# Patient Record
Sex: MALE | Race: WHITE
[De-identification: names, ages, dates, MRNs, and addresses within clinical notes are randomized per-mention and may not be internally consistent; named-entity substitution may affect disease eponyms.]

---

## 2020-04-09 NOTE — NUR
PATIENT BROUGHT TO ER FOR BLOOD ALCOHOL DRAW TO BE PLACED INTO EVIDENCE; 
OFFICER PACO (#[id number]) MADE REQUEST AND PATIENT OFFERS HIS VERBAL CONSENT FOR 
PROCEDURE



EQUIPMENT FOR PROCEDURE WAS GATHERED AND OFFICER PACO PROVIDED TWO VIALS



AT 1819, PATIENT WAS PLACED SUPINE AND LEFT ARM EXTENDED; NURSE PUT ON SIZE 
LARGE NITRILE GLOVES AND MID FOREARM WAS PREPPED WITH POVIDONE-IODINE 10% 
SOLUTION ON 4X4 GAUZE; TOURNIQUET WAS APPLIED



12 CC SYRINGE WITH 22 GAUGE NEEDLE WAS PREPARED AND USED TO PUNCTURE LARGE VEIN 
ON THE MID ANTERIOR ASPECT OF THE LEFT FOREARM; (BEVEL UP)



11 CC'S OF VENOUS BLOOD WAS GENTLY ASPIRATED; NEEDLE REMOVED AND 4X4 GAUZE 
DRESSING APPLIED WITH PLASTIC TAPE; TOURNIQUET WAS REMOVED



10CC OF THE BLOOD SAMPLE WAS GENTLY INJECTED INTO FIRST VIAL AND GIVEN TO 
OFFICER PACO



A SECOND VIAL WAS PRESENTED AND 1 CC OF THE BLOOD SAMPLE WAS GENTLY INJECTED 
INTO THE VIAL (STATED WOULD BE THE PATIENT'S VIAL IF HE WISHED TO KEEP IT); 
ALSO GIVEN TO OFFICER PACO



PATIENT LEFT WITH OFFICER ANTONIO IN CUSTODY

## 2023-02-04 ENCOUNTER — HOSPITAL ENCOUNTER (INPATIENT)
Dept: HOSPITAL 4 - SED | Age: 38
LOS: 4 days | Discharge: HOME HEALTH | DRG: 516 | End: 2023-02-08
Attending: FAMILY MEDICINE | Admitting: FAMILY MEDICINE
Payer: COMMERCIAL

## 2023-02-04 VITALS — SYSTOLIC BLOOD PRESSURE: 119 MMHG

## 2023-02-04 VITALS — HEIGHT: 63 IN | BODY MASS INDEX: 27.46 KG/M2 | WEIGHT: 155 LBS

## 2023-02-04 VITALS — SYSTOLIC BLOOD PRESSURE: 140 MMHG

## 2023-02-04 VITALS — SYSTOLIC BLOOD PRESSURE: 126 MMHG

## 2023-02-04 DIAGNOSIS — Z79.899: ICD-10-CM
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
## 2023-02-04 DIAGNOSIS — S82.032A: Primary | ICD-10-CM
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
## 2023-02-04 DIAGNOSIS — J98.11: ICD-10-CM

## 2023-02-04 DIAGNOSIS — Z20.822: ICD-10-CM

## 2023-02-04 LAB
ANION GAP SERPL CALCULATED.3IONS-SCNC: 13 MMOL/L (ref 5–15)
BASOPHILS # BLD AUTO: 0.1 K/UL (ref 0–0.2)
BASOPHILS NFR BLD AUTO: 0.6 % (ref 0–2)
BUN SERPL-MCNC: 19 MG/DL (ref 8–21)
CALCIUM SERPL-MCNC: 8.7 MG/DL (ref 8.4–11)
CHLORIDE SERPL-SCNC: 103 MMOL/L (ref 98–107)
CREAT SERPL-MCNC: 0.99 MG/DL (ref 0.55–1.3)
EOSINOPHIL # BLD AUTO: 0 K/UL (ref 0–0.4)
EOSINOPHIL NFR BLD AUTO: 0.2 % (ref 0–4)
ERYTHROCYTE [DISTWIDTH] IN BLOOD BY AUTOMATED COUNT: 13.5 % (ref 9–15)
GFR SERPL CREATININE-BSD FRML MDRD: 108 ML/MIN (ref 90–?)
GLUCOSE SERPL-MCNC: 131 MG/DL (ref 70–99)
HCT VFR BLD AUTO: 45.8 % (ref 36–54)
HGB BLD-MCNC: 15.8 G/DL (ref 14–18)
INR PPP: 1 (ref 0.8–1.2)
LYMPHOCYTES # BLD AUTO: 1.1 K/UL (ref 1–5.5)
LYMPHOCYTES NFR BLD AUTO: 10.5 % (ref 20.5–51.5)
MCH RBC QN AUTO: 31 PG (ref 27–31)
MCHC RBC AUTO-ENTMCNC: 35 % (ref 32–36)
MCV RBC AUTO: 90 FL (ref 79–98)
MONOCYTES # BLD MANUAL: 0.3 K/UL (ref 0–1)
MONOCYTES # BLD MANUAL: 3.2 % (ref 1.7–9.3)
NEUTROPHILS # BLD AUTO: 8.7 K/UL (ref 1.8–7.7)
NEUTROPHILS NFR BLD AUTO: 85.5 % (ref 40–70)
PLATELET # BLD AUTO: 237 K/UL (ref 130–430)
PROTHROMBIN TIME: 10.6 SECS (ref 9.5–12.5)
RBC # BLD AUTO: 5.08 MIL/UL (ref 4.2–6.2)
WBC # BLD AUTO: 10.2 K/UL (ref 4.8–10.8)

## 2023-02-04 PROCEDURE — 0QSF04Z REPOSITION LEFT PATELLA WITH INTERNAL FIXATION DEVICE, OPEN APPROACH: ICD-10-PCS | Performed by: STUDENT IN AN ORGANIZED HEALTH CARE EDUCATION/TRAINING PROGRAM

## 2023-02-04 PROCEDURE — C1713 ANCHOR/SCREW BN/BN,TIS/BN: HCPCS

## 2023-02-04 RX ADMIN — HYDROMORPHONE HYDROCHLORIDE PRN MG: 1 INJECTION, SOLUTION INTRAMUSCULAR; INTRAVENOUS; SUBCUTANEOUS at 13:17

## 2023-02-04 RX ADMIN — HYDROMORPHONE HYDROCHLORIDE PRN MG: 1 INJECTION, SOLUTION INTRAMUSCULAR; INTRAVENOUS; SUBCUTANEOUS at 21:10

## 2023-02-04 RX ADMIN — ONDANSETRON PRN MG: 2 INJECTION INTRAMUSCULAR; INTRAVENOUS at 08:20

## 2023-02-04 RX ADMIN — DEXTROSE SCH MLS/HR: 50 INJECTION, SOLUTION INTRAVENOUS at 21:15

## 2023-02-04 RX ADMIN — ONDANSETRON PRN MG: 2 INJECTION INTRAMUSCULAR; INTRAVENOUS at 21:10

## 2023-02-04 NOTE — NUR
Report received from day shift RN for continuity of care. Patient stable in bed. Polar pump 
noted on left leg with ace wrap around. VSS

## 2023-02-04 NOTE — NUR
Patient will be admitted to care of [facility].  Admitted to Med Surg unit.  Will go 
to operating room with OR GARTH Lynne.  Belongings list completed.  Complete and 
up to date summary report printed. SBAR report to be given at bedside with 
opportunity for questions.

## 2023-02-04 NOTE — NUR
General Surgery Week 3 Survey    Flowsheet Row Responses   [facility] facility patient discharged from? [place]   Does the patient have one of the following disease processes/diagnoses(primary or secondary)? General Surgery   Week 3 attempt successful? No   Unsuccessful attempts Attempt 1          TAM FLORES - Registered Nurse   pt is here left knee pain about 10/10 the is swelling but no redness noted, he 
slipped and he thinks that he disclocated his knee. He has been drinking since 
it was his birthday. room air

## 2023-02-04 NOTE — NUR
Admit bed requested 



Patient will be admitted to care of 

Admitted to MS unit.

Diagnosis  Left Knee Fracture

Inpatient (Yes or No)  yes

Observation (Yes or No) no

Orientation concerns or request close to nursing station  (Yes or No) no

Covid Status pending

On vent or bipap no

Isolation requirements no

Needs a sitter no

From Home (Yes or if No enter name of facility) yes

Requires Dialysis (Yes or No) no 

Med Rec Completed (Yes of No) no home meds

## 2023-02-04 NOTE — NUR
Recieved report from GARTH Suggs; Pt is aaox4, complains of left knee pain from 
mechanical fall post alcohol birthday celebration. Pt is NPO per MD Rose. 
Orders of consult to be contacted MD Estevez Orthopedist; notified Unit 
secretartbraeden Painter.

## 2023-02-04 NOTE — NUR
Pt vomiting into emesis basin; pt blood pressure stable. Pt complains of 
nausea. Contacted MD Rose and left voice mail for return.

## 2023-02-05 VITALS — SYSTOLIC BLOOD PRESSURE: 117 MMHG

## 2023-02-05 VITALS — SYSTOLIC BLOOD PRESSURE: 134 MMHG

## 2023-02-05 VITALS — SYSTOLIC BLOOD PRESSURE: 139 MMHG

## 2023-02-05 VITALS — SYSTOLIC BLOOD PRESSURE: 148 MMHG

## 2023-02-05 RX ADMIN — HYDROCODONE BITARTRATE AND ACETAMINOPHEN PRN TAB: 5; 325 TABLET ORAL at 18:36

## 2023-02-05 RX ADMIN — KETOROLAC TROMETHAMINE PRN MG: 15 INJECTION, SOLUTION INTRAMUSCULAR; INTRAVENOUS at 16:45

## 2023-02-05 RX ADMIN — KETOROLAC TROMETHAMINE PRN MG: 15 INJECTION, SOLUTION INTRAMUSCULAR; INTRAVENOUS at 04:08

## 2023-02-05 RX ADMIN — DEXTROSE SCH MLS/HR: 50 INJECTION, SOLUTION INTRAVENOUS at 04:08

## 2023-02-05 RX ADMIN — HYDROCODONE BITARTRATE AND ACETAMINOPHEN PRN TAB: 5; 325 TABLET ORAL at 14:06

## 2023-02-05 RX ADMIN — KETOROLAC TROMETHAMINE PRN MG: 15 INJECTION, SOLUTION INTRAMUSCULAR; INTRAVENOUS at 11:00

## 2023-02-05 RX ADMIN — DEXTROSE SCH MLS/HR: 50 INJECTION, SOLUTION INTRAVENOUS at 20:47

## 2023-02-05 RX ADMIN — DEXTROSE SCH MLS/HR: 50 INJECTION, SOLUTION INTRAVENOUS at 14:06

## 2023-02-05 RX ADMIN — HYDROCODONE BITARTRATE AND ACETAMINOPHEN PRN TAB: 5; 325 TABLET ORAL at 09:52

## 2023-02-05 NOTE — NUR
Patient c/o knee pain again. Patient pain meds not due. Dr. Rose notified. New orders noted 
and carried out.

## 2023-02-05 NOTE — NUR
PATIENT C/O 6/10 KNEE PAIN, TOO SOON FOR IV TORADOL AND PATIENT DECLINES IV DILAUDID, ORDER 
FOR PO NORCO OBTAINED FROM NP

## 2023-02-05 NOTE — NUR
Patient resting and changed as appropriate. Vital signs stable. Patient IV started on right 
AC 22G and is C/D/I with good blood return.

## 2023-02-05 NOTE — NUR
RECEIVED PATIENT FROM PM NURSE, ALERT AND ORIENTED, ABLE TO VERBALIZE NEEDS, NO C/O PAIN AT 
THIS TIME, POLAR CARE WITH IMMOBILIZER IN PLACE, CMS CHECK DONE, WILL ASSUME ALL CARE OF 
PATIENT

## 2023-02-06 VITALS — SYSTOLIC BLOOD PRESSURE: 132 MMHG

## 2023-02-06 VITALS — SYSTOLIC BLOOD PRESSURE: 112 MMHG

## 2023-02-06 VITALS — SYSTOLIC BLOOD PRESSURE: 119 MMHG

## 2023-02-06 VITALS — SYSTOLIC BLOOD PRESSURE: 114 MMHG

## 2023-02-06 VITALS — SYSTOLIC BLOOD PRESSURE: 108 MMHG

## 2023-02-06 VITALS — SYSTOLIC BLOOD PRESSURE: 146 MMHG

## 2023-02-06 LAB
ANION GAP SERPL CALCULATED.3IONS-SCNC: 8 MMOL/L (ref 5–15)
BASOPHILS # BLD AUTO: 0 K/UL (ref 0–0.2)
BASOPHILS NFR BLD AUTO: 0.2 % (ref 0–2)
BUN SERPL-MCNC: 14 MG/DL (ref 8–21)
CALCIUM SERPL-MCNC: 8.5 MG/DL (ref 8.4–11)
CHLORIDE SERPL-SCNC: 103 MMOL/L (ref 98–107)
CREAT SERPL-MCNC: 1.19 MG/DL (ref 0.55–1.3)
EOSINOPHIL # BLD AUTO: 0 K/UL (ref 0–0.4)
EOSINOPHIL NFR BLD AUTO: 0.5 % (ref 0–4)
ERYTHROCYTE [DISTWIDTH] IN BLOOD BY AUTOMATED COUNT: 13.4 % (ref 9–15)
GFR SERPL CREATININE-BSD FRML MDRD: 88 ML/MIN (ref 90–?)
GLUCOSE SERPL-MCNC: 141 MG/DL (ref 70–99)
HCT VFR BLD AUTO: 39 % (ref 36–54)
HGB BLD-MCNC: 13.7 G/DL (ref 14–18)
LYMPHOCYTES # BLD AUTO: 1.8 K/UL (ref 1–5.5)
LYMPHOCYTES NFR BLD AUTO: 22.1 % (ref 20.5–51.5)
MCH RBC QN AUTO: 32 PG (ref 27–31)
MCHC RBC AUTO-ENTMCNC: 35 % (ref 32–36)
MCV RBC AUTO: 90 FL (ref 79–98)
MONOCYTES # BLD MANUAL: 0.5 K/UL (ref 0–1)
MONOCYTES # BLD MANUAL: 6 % (ref 1.7–9.3)
NEUTROPHILS # BLD AUTO: 5.7 K/UL (ref 1.8–7.7)
NEUTROPHILS NFR BLD AUTO: 71.2 % (ref 40–70)
PLATELET # BLD AUTO: 200 K/UL (ref 130–430)
RBC # BLD AUTO: 4.34 MIL/UL (ref 4.2–6.2)
WBC # BLD AUTO: 8 K/UL (ref 4.8–10.8)

## 2023-02-06 RX ADMIN — DEXTROSE SCH MLS/HR: 50 INJECTION, SOLUTION INTRAVENOUS at 04:22

## 2023-02-06 RX ADMIN — HYDROCODONE BITARTRATE AND ACETAMINOPHEN PRN TAB: 5; 325 TABLET ORAL at 15:50

## 2023-02-06 RX ADMIN — ENOXAPARIN SODIUM SCH MG: 40 INJECTION SUBCUTANEOUS at 17:15

## 2023-02-06 RX ADMIN — KETOROLAC TROMETHAMINE PRN MG: 15 INJECTION, SOLUTION INTRAMUSCULAR; INTRAVENOUS at 17:15

## 2023-02-06 RX ADMIN — DEXTROSE SCH MLS/HR: 50 INJECTION, SOLUTION INTRAVENOUS at 21:15

## 2023-02-06 RX ADMIN — HYDROMORPHONE HYDROCHLORIDE PRN MG: 1 INJECTION, SOLUTION INTRAMUSCULAR; INTRAVENOUS; SUBCUTANEOUS at 18:30

## 2023-02-06 RX ADMIN — HYDROMORPHONE HYDROCHLORIDE PRN MG: 1 INJECTION, SOLUTION INTRAMUSCULAR; INTRAVENOUS; SUBCUTANEOUS at 12:53

## 2023-02-06 RX ADMIN — DEXTROSE SCH MLS/HR: 50 INJECTION, SOLUTION INTRAVENOUS at 15:08

## 2023-02-06 RX ADMIN — HYDROCODONE BITARTRATE AND ACETAMINOPHEN PRN TAB: 5; 325 TABLET ORAL at 21:14

## 2023-02-06 RX ADMIN — HYDROCODONE BITARTRATE AND ACETAMINOPHEN PRN TAB: 5; 325 TABLET ORAL at 04:22

## 2023-02-06 RX ADMIN — HYDROMORPHONE HYDROCHLORIDE PRN MG: 1 INJECTION, SOLUTION INTRAMUSCULAR; INTRAVENOUS; SUBCUTANEOUS at 08:29

## 2023-02-07 VITALS — SYSTOLIC BLOOD PRESSURE: 129 MMHG

## 2023-02-07 VITALS — SYSTOLIC BLOOD PRESSURE: 153 MMHG

## 2023-02-07 VITALS — SYSTOLIC BLOOD PRESSURE: 124 MMHG

## 2023-02-07 VITALS — SYSTOLIC BLOOD PRESSURE: 157 MMHG

## 2023-02-07 VITALS — SYSTOLIC BLOOD PRESSURE: 136 MMHG

## 2023-02-07 VITALS — SYSTOLIC BLOOD PRESSURE: 126 MMHG

## 2023-02-07 LAB
ANION GAP SERPL CALCULATED.3IONS-SCNC: 7 MMOL/L (ref 5–15)
BASOPHILS # BLD AUTO: 0 K/UL (ref 0–0.2)
BASOPHILS NFR BLD AUTO: 0.5 % (ref 0–2)
BUN SERPL-MCNC: 14 MG/DL (ref 8–21)
CALCIUM SERPL-MCNC: 9.1 MG/DL (ref 8.4–11)
CHLORIDE SERPL-SCNC: 101 MMOL/L (ref 98–107)
CREAT SERPL-MCNC: 1.03 MG/DL (ref 0.55–1.3)
EOSINOPHIL # BLD AUTO: 0.1 K/UL (ref 0–0.4)
EOSINOPHIL NFR BLD AUTO: 0.8 % (ref 0–4)
ERYTHROCYTE [DISTWIDTH] IN BLOOD BY AUTOMATED COUNT: 13.3 % (ref 9–15)
GFR SERPL CREATININE-BSD FRML MDRD: 103 ML/MIN (ref 90–?)
GLUCOSE SERPL-MCNC: 123 MG/DL (ref 70–99)
HCT VFR BLD AUTO: 43.2 % (ref 36–54)
HGB BLD-MCNC: 14.8 G/DL (ref 14–18)
LYMPHOCYTES # BLD AUTO: 1.1 K/UL (ref 1–5.5)
LYMPHOCYTES NFR BLD AUTO: 16.8 % (ref 20.5–51.5)
MCH RBC QN AUTO: 31 PG (ref 27–31)
MCHC RBC AUTO-ENTMCNC: 34 % (ref 32–36)
MCV RBC AUTO: 91 FL (ref 79–98)
MONOCYTES # BLD MANUAL: 0.5 K/UL (ref 0–1)
MONOCYTES # BLD MANUAL: 6.9 % (ref 1.7–9.3)
NEUTROPHILS # BLD AUTO: 5.1 K/UL (ref 1.8–7.7)
NEUTROPHILS NFR BLD AUTO: 75 % (ref 40–70)
PLATELET # BLD AUTO: 213 K/UL (ref 130–430)
RBC # BLD AUTO: 4.72 MIL/UL (ref 4.2–6.2)
WBC # BLD AUTO: 6.8 K/UL (ref 4.8–10.8)

## 2023-02-07 RX ADMIN — HYDROCODONE BITARTRATE AND ACETAMINOPHEN PRN TAB: 5; 325 TABLET ORAL at 09:02

## 2023-02-07 RX ADMIN — DEXTROSE SCH MLS/HR: 50 INJECTION, SOLUTION INTRAVENOUS at 22:39

## 2023-02-07 RX ADMIN — HYDROCODONE BITARTRATE AND ACETAMINOPHEN PRN TAB: 5; 325 TABLET ORAL at 22:47

## 2023-02-07 RX ADMIN — DEXTROSE SCH MLS/HR: 50 INJECTION, SOLUTION INTRAVENOUS at 14:16

## 2023-02-07 RX ADMIN — KETOROLAC TROMETHAMINE PRN MG: 15 INJECTION, SOLUTION INTRAMUSCULAR; INTRAVENOUS at 14:21

## 2023-02-07 RX ADMIN — DOCUSATE SODIUM SCH MG: 100 CAPSULE, LIQUID FILLED ORAL at 08:03

## 2023-02-07 RX ADMIN — DEXTROSE SCH MLS/HR: 50 INJECTION, SOLUTION INTRAVENOUS at 05:10

## 2023-02-07 RX ADMIN — HYDROCODONE BITARTRATE AND ACETAMINOPHEN PRN TAB: 5; 325 TABLET ORAL at 15:27

## 2023-02-07 RX ADMIN — KETOROLAC TROMETHAMINE PRN MG: 15 INJECTION, SOLUTION INTRAMUSCULAR; INTRAVENOUS at 21:06

## 2023-02-07 RX ADMIN — ENOXAPARIN SODIUM SCH MG: 40 INJECTION SUBCUTANEOUS at 17:41

## 2023-02-07 RX ADMIN — KETOROLAC TROMETHAMINE PRN MG: 15 INJECTION, SOLUTION INTRAMUSCULAR; INTRAVENOUS at 01:55

## 2023-02-07 NOTE — NUR
RECEIVED PT LYING IN BED, NO DISTRESS NOTED, HAS SOME DISCOMFORT TO LLE.  AAOX4, O2 SAT 97% 
ON RA, USING INCENTIVE SPIROMETER 2500ML, IV TO RT FA SITE CDI.  DRSG TO LLE CDI.  WILL 
POLAR ICE.  LLE + SENSATION, + MOBILITY, + WARMTH, BRISK CAP REFILL AND PALPABLE PULSE.

## 2023-02-08 VITALS — SYSTOLIC BLOOD PRESSURE: 114 MMHG

## 2023-02-08 VITALS — SYSTOLIC BLOOD PRESSURE: 139 MMHG

## 2023-02-08 VITALS — SYSTOLIC BLOOD PRESSURE: 118 MMHG

## 2023-02-08 VITALS — SYSTOLIC BLOOD PRESSURE: 137 MMHG

## 2023-02-08 LAB
ANION GAP SERPL CALCULATED.3IONS-SCNC: 7 MMOL/L (ref 5–15)
BASOPHILS # BLD AUTO: 0 K/UL (ref 0–0.2)
BASOPHILS NFR BLD AUTO: 0.6 % (ref 0–2)
BUN SERPL-MCNC: 21 MG/DL (ref 8–21)
CALCIUM SERPL-MCNC: 8.9 MG/DL (ref 8.4–11)
CHLORIDE SERPL-SCNC: 102 MMOL/L (ref 98–107)
CREAT SERPL-MCNC: 1.1 MG/DL (ref 0.55–1.3)
EOSINOPHIL # BLD AUTO: 0.1 K/UL (ref 0–0.4)
EOSINOPHIL NFR BLD AUTO: 2.2 % (ref 0–4)
ERYTHROCYTE [DISTWIDTH] IN BLOOD BY AUTOMATED COUNT: 13 % (ref 9–15)
GFR SERPL CREATININE-BSD FRML MDRD: 96 ML/MIN (ref 90–?)
GLUCOSE SERPL-MCNC: 112 MG/DL (ref 70–99)
HCT VFR BLD AUTO: 40 % (ref 36–54)
HGB BLD-MCNC: 13.4 G/DL (ref 14–18)
LYMPHOCYTES # BLD AUTO: 1.6 K/UL (ref 1–5.5)
LYMPHOCYTES NFR BLD AUTO: 26.4 % (ref 20.5–51.5)
MCH RBC QN AUTO: 31 PG (ref 27–31)
MCHC RBC AUTO-ENTMCNC: 34 % (ref 32–36)
MCV RBC AUTO: 92 FL (ref 79–98)
MONOCYTES # BLD MANUAL: 0.4 K/UL (ref 0–1)
MONOCYTES # BLD MANUAL: 6.6 % (ref 1.7–9.3)
NEUTROPHILS # BLD AUTO: 3.9 K/UL (ref 1.8–7.7)
NEUTROPHILS NFR BLD AUTO: 64.2 % (ref 40–70)
PLATELET # BLD AUTO: 194 K/UL (ref 130–430)
RBC # BLD AUTO: 4.35 MIL/UL (ref 4.2–6.2)
WBC # BLD AUTO: 6.1 K/UL (ref 4.8–10.8)

## 2023-02-08 RX ADMIN — KETOROLAC TROMETHAMINE PRN MG: 15 INJECTION, SOLUTION INTRAMUSCULAR; INTRAVENOUS at 08:55

## 2023-02-08 RX ADMIN — DEXTROSE SCH MLS/HR: 50 INJECTION, SOLUTION INTRAVENOUS at 05:22

## 2023-02-08 RX ADMIN — DOCUSATE SODIUM SCH MG: 100 CAPSULE, LIQUID FILLED ORAL at 08:56

## 2023-02-08 RX ADMIN — HYDROMORPHONE HYDROCHLORIDE PRN MG: 1 INJECTION, SOLUTION INTRAMUSCULAR; INTRAVENOUS; SUBCUTANEOUS at 11:17

## 2023-02-08 RX ADMIN — HYDROMORPHONE HYDROCHLORIDE PRN MG: 1 INJECTION, SOLUTION INTRAMUSCULAR; INTRAVENOUS; SUBCUTANEOUS at 01:41

## 2023-02-08 RX ADMIN — DEXTROSE SCH MLS/HR: 50 INJECTION, SOLUTION INTRAVENOUS at 14:45

## 2023-02-08 RX ADMIN — ONDANSETRON PRN MG: 2 INJECTION INTRAMUSCULAR; INTRAVENOUS at 11:17

## 2023-02-08 RX ADMIN — ONDANSETRON PRN MG: 2 INJECTION INTRAMUSCULAR; INTRAVENOUS at 01:41

## 2023-02-08 NOTE — NUR
PATIENT RESTING IN BED, AAO x4,RESPIRATIONS EVEN AND UL ON RA. NO C/O PAIN AT THIS TIME. 
DRESSING TO LLE CDI. PULSES PALPABLE, DENIES NUMBNESS/TINGLING, ABLE TO WIGGLE TOES, WARM TO 
TOUCH. SAFETY MEASURES IN PLACE, CALL LIGHT IN REACH, ALL NEEDS MET AT THIS TIME.

## 2023-02-08 NOTE — NUR
Assessment:



The patient is a 38-year-old male who is alert and oriented.  I completed the initial 
assessment with the patient at bedside.  The patient presented as verbally aggressive and 
agitated due to having a headache, the beeping on his machine, and having to wait for the 
drKristian to come see him in order to leave.  I introduced myself to him and explained why I was 
coming to visit.   Per patient, he resides in a single-story home with his family.  He 
independent with his ADLs.  He has no assistive devices that he was using prior to 
admission.  He has support through his mother and family.  The patient does not have a Power 
of .   His PCP is Dr. Healy in [place].  The discharge plan is to return home 
with his family.  The patient was advised that at time of discharge, he would be assessed 
for appropriate needs and services.  According to the patient, there are no anticipated 
discharge needs.  At time of discharge, the patient states his mother will transport him 
home.  



Prior to leaving, I explained in home care and how that is rendered.  I informed him that 
the insurance does not cover private in-home care, but I could offer him information on 
private care giving services.  The patient declined the information that I was offering as 
he stated he needed nothing from me, but to just be able to leave.  I informed him that the 
nurse was working on his discharge orders and working to process everything.  Per patient, 
he received resources already and doesnt anything else.  At the patients request, no 
additional information was left for the patient.

## 2023-02-08 NOTE — NUR
PATIENT DISCHARGED HOME WITH FAMILY VIA WHEELCHAIR AND FAMILY VEHICLE, ACCOMPANIED BY CNA TO 
MAIN ENTRANCE. PT DISCHARGED WITH ALL BELONGINGS INCLUDING CLOTHING, CELL PHONE AND 
CELLPHONE .

## 2023-02-08 NOTE — NUR
RECEIVED DISCHARGE ORDERS, PRINTED DISCHARGE INSTRUCTIONS GIVEN AND EXPLAINED TO PATIENT. 
PATIENT VERBALIZED UNDERSTANDING OF ALL DISCHARGE INSTRUCTIONS/PRESCRIPTIONS/EDUCATION 
MATERIALS. PT AWARE TO FOLLOW UP WITH PCP IN 1-2 DAYS AND STATES HIS MOTHER WILL PICK HIM UP 
AND WILL ALSO  PRESCRIPTIONS. IV SITE TO RFA REMOVED, CATH INTACT, NO BLEEDING NOTED. 
PT HAS ALL BELONGINGS AND WILL BE DISCHARGED WITH THEM.

## 2023-02-08 NOTE — NUR
PATIENT RESTING IN BED, AAO x4, NO S/S OF DISTRESS. DRESSING TO LLE CDI, NO CHANGES NOTED. 
ALL NEEDS MET CALL LIGHT IN REACH.

## 2023-02-09 NOTE — NUR
***** PHYSICAL THERAPY CO-SIGN *****

The Physical Therapy Progress Notes documented by Physical Therapy Assistant have been 
reviewed.



Reviewed/Co-Signed by:  Daryl Friedman

Documentation Done by:HAWA DNENY

-------------------------------------------------------------------------------

Addendum: 02/09/23 at 1313 by Daryl Friedman PT

-------------------------------------------------------------------------------

Amended: Links added.